# Patient Record
(demographics unavailable — no encounter records)

---

## 2025-05-15 NOTE — DEVELOPMENTAL MILESTONES
[Normal Development] : Normal Development [Cries with discomfort] : cries with discomfort [Calms to adult voice] : calms to adult voice [Reflexively moves arms and legs] : reflexively moves arms and legs [Holds fingers closed] : holds fingers closed [Grasps reflexively] : grasp reflexively [Passed] : passed

## 2025-05-15 NOTE — PHYSICAL EXAM
[Alert] : alert [Normocephalic] : normocephalic [Red Reflex Bilateral] : red reflex bilateral [Normally Placed Ears] : normally placed ears [Nares Patent] : nares patent [Palate Intact] : palate intact [Symmetric Chest Rise] : symmetric chest rise [Clear to Auscultation Bilaterally] : clear to auscultation bilaterally [Normoactive Precordium] : no normoactive precordium [Regular Rate and Rhythm] : regular rate and rhythm [S1, S2 present] : S1, S2 present [Soft] : soft [Umbilical Stump Dry, Clean, Intact] : umbilical stump dry, clean, intact [Normal external genitalia] : normal external genitalia [Patent] : patent [Normally Placed] : normally placed [Preauricular] : preauricular [Post Auricular] : post auricular [Axillary] : axillary [Suck Reflex] : suck reflex present [Rooting] : rooting reflex present [Palmar Grasp] : palmar grasp present [Plantar Grasp] : plantar reflex present [Acute Distress] : no acute distress [Crying] : not crying [Molding] : no molding [Discharge] : no discharge [Murmurs] : no murmurs [Tender] : nontender [Distended] : not distended [Clavicular Crepitus] : no clavicular crepitus [Spinal Dimple] : no spinal dimple [Tuft of Hair] : no tuft of hair [Jaundice] : not jaundice

## 2025-05-15 NOTE — HISTORY OF PRESENT ILLNESS
[Breast milk] : breast milk [Hours between feeds ___] : Child is fed every [unfilled] hours [Normal] : Normal [___ voids per day] : [unfilled] voids per day [Frequency of stools: ___] : Frequency of stools: [unfilled]  stools [Yellow] : yellow [Loose] : loose consistency [In Bassinet/Crib] : sleeps in bassinet/crib [On back] : sleeps on back [No] : No cigarette smoke exposure [Water heater temperature set at <120 degrees F] : Water heater temperature set at <120 degrees F [Rear facing car seat in back seat] : Rear facing car seat in back seat [Hepatitis B Vaccine Given] : Hepatitis B vaccine given [Co-sleeping] : no co-sleeping [Exposure to electronic nicotine delivery system] : No exposure to electronic nicotine delivery system [FreeTextEntry7] : No problems

## 2025-06-12 NOTE — DISCUSSION/SUMMARY
[Normal Growth] : growth [Normal Development] : development  [Continue Regimen] : feeding [Age Approp Vaccines] : Age appropriate vaccines administered [Mother] : mother [Father] : father [de-identified] : angioma, will monitor

## 2025-06-12 NOTE — PHYSICAL EXAM
[Alert] : alert [Normocephalic] : normocephalic [Acute Distress] : no acute distress [Crying] : not crying [Conjunctivae with no discharge] : conjunctivae with no discharge [Red Reflex Bilateral] : red reflex bilateral [Normally Placed Ears] : normally placed ears [Discharge] : no discharge [Nares Patent] : nares patent [Palate Intact] : palate intact [Supple, full passive range of motion] : supple, full passive range of motion [Symmetric Chest Rise] : symmetric chest rise [Clear to Auscultation Bilaterally] : clear to auscultation bilaterally [Regular Rate and Rhythm] : regular rate and rhythm [S1, S2 present] : S1, S2 present [Soft] : soft [Tender] : nontender [Distended] : not distended [Normal external genitailia] : normal external genitalia [Clavicular Crepitus] : no clavicular crepitus [Suck Reflex] : suck reflex present [Rooting] : rooting reflex present [Palmar Grasp] : palmar grasp reflex present [Plantar Grasp] : plantar grasp reflex present [Jaundice] : no jaundice [de-identified] : angioma on right middle quadrant of abdomen

## 2025-06-12 NOTE — DISCUSSION/SUMMARY
[Normal Growth] : growth [Normal Development] : development  [Continue Regimen] : feeding [Age Approp Vaccines] : Age appropriate vaccines administered [Mother] : mother [Father] : father [de-identified] : angioma, will monitor

## 2025-06-12 NOTE — PHYSICAL EXAM
[Alert] : alert [Normocephalic] : normocephalic [Acute Distress] : no acute distress [Crying] : not crying [Conjunctivae with no discharge] : conjunctivae with no discharge [Red Reflex Bilateral] : red reflex bilateral [Normally Placed Ears] : normally placed ears [Discharge] : no discharge [Nares Patent] : nares patent [Palate Intact] : palate intact [Supple, full passive range of motion] : supple, full passive range of motion [Symmetric Chest Rise] : symmetric chest rise [Clear to Auscultation Bilaterally] : clear to auscultation bilaterally [Regular Rate and Rhythm] : regular rate and rhythm [S1, S2 present] : S1, S2 present [Soft] : soft [Tender] : nontender [Distended] : not distended [Normal external genitailia] : normal external genitalia [Clavicular Crepitus] : no clavicular crepitus [Suck Reflex] : suck reflex present [Rooting] : rooting reflex present [Palmar Grasp] : palmar grasp reflex present [Plantar Grasp] : plantar grasp reflex present [Jaundice] : no jaundice [de-identified] : angioma on right middle quadrant of abdomen

## 2025-06-12 NOTE — HISTORY OF PRESENT ILLNESS
[Breast milk] : breast milk [No] : No cigarette smoke exposure [Formula ___ oz/feed] : [unfilled] oz of formula per feed [Formula ___ oz in 24hrs] : [unfilled] oz of formula in 24 hours [Vitamins ___] : Patient takes [unfilled] vitamins daily [Normal] : Normal [___ voids per day] : [unfilled] voids per day [Frequency of stools: ___] : Frequency of stools: [unfilled]  stools [per day] : per day. [Yellow] : yellow [In Bassinet/Crib] : sleeps in bassinet/crib [On back] : sleeps on back [Pacifier use] : Pacifier use [Co-sleeping] : no co-sleeping [Loose bedding, pillow, toys, and/or bumpers in crib] : no loose bedding, pillow, toys, and/or bumpers in crib [Exposure to electronic nicotine delivery system] : No exposure to electronic nicotine delivery system [FreeTextEntry7] : Negative [FreeTextEntry1] : Patient presenting with mom and dad for 1-month [facility]  Mom switched baby to formula feeds because baby was having reflux from breast milk even after sitting her up straight for more than 20 mins after feeds. Reports that the reflux only resolved with formula feeds. Baby is not sleeping throughout the night and wakes up crying, consolable when held. Also points out that patient has a small cruise on the stomach that now resolved.

## 2025-06-12 NOTE — DEVELOPMENTAL MILESTONES
[Normal Development] : Normal Development [Calms when picked up or spoken to] : calms when picked up or spoken to [Looks briefly at objects] : looks briefly at objects [Alerts to unexpected sound] : alerts to unexpected sound [Holds chin up in prone] : holds chin up in prone [Makes brief short vowel sounds] : does not make brief short vowel sounds

## 2025-06-12 NOTE — HISTORY OF PRESENT ILLNESS
[Breast milk] : breast milk [No] : No cigarette smoke exposure [Formula ___ oz/feed] : [unfilled] oz of formula per feed [Formula ___ oz in 24hrs] : [unfilled] oz of formula in 24 hours [Vitamins ___] : Patient takes [unfilled] vitamins daily [Normal] : Normal [___ voids per day] : [unfilled] voids per day [Frequency of stools: ___] : Frequency of stools: [unfilled]  stools [per day] : per day. [Yellow] : yellow [In Bassinet/Crib] : sleeps in bassinet/crib [On back] : sleeps on back [Pacifier use] : Pacifier use [Co-sleeping] : no co-sleeping [Loose bedding, pillow, toys, and/or bumpers in crib] : no loose bedding, pillow, toys, and/or bumpers in crib [Exposure to electronic nicotine delivery system] : No exposure to electronic nicotine delivery system [FreeTextEntry7] : Negative [FreeTextEntry1] : Patient presenting with mom and dad for 1-month [facility]  Mom switched baby to formula feeds because baby was having reflux from breast milk even after sitting her up straight for more than 20 mins after feeds. Reports that the reflux only resolved with formula feeds. Baby is not sleeping throughout the night and wakes up crying, consolable when held. Also points out that patient has a small cruise on the stomach that now resolved.

## 2025-07-17 NOTE — REVIEW OF SYSTEMS
[Jaundice] : no jaundice [Rash] : no rash [Dry Skin] : no dry skin [Itching] : no itching [Birthmarks] : birthmarks [Seborrhea] : no seborrhea [Negative] : Genitourinary

## 2025-07-17 NOTE — HISTORY OF PRESENT ILLNESS
[Parents] : parents [Breast milk] : breast milk [Formula ___ oz/feed] : [unfilled] oz of formula per feed [Normal] : Normal [___ voids per day] : [unfilled] voids per day [Frequency of stools: ___] : Frequency of stools: [unfilled]  stools [per day] : per day. [Yellow] : yellow [Loose] : loose consistency [Co-sleeping] : co-sleeping [Pacifier use] : not using pacifier [No] : No cigarette smoke exposure [Water heater temperature set at <120 degrees F] : Water heater temperature set at <120 degrees F [Rear facing car seat in back seat] : Rear facing car seat in back seat [Carbon Monoxide Detectors] : Carbon monoxide detectors at home [Smoke Detectors] : Smoke detectors at home. [At risk for exposure to TB] : At risk for exposure to Tuberculosis  [de-identified] : breast every 2 hrs, formula bid  [NO] : No